# Patient Record
Sex: MALE | Race: BLACK OR AFRICAN AMERICAN | NOT HISPANIC OR LATINO | Employment: OTHER | URBAN - METROPOLITAN AREA
[De-identification: names, ages, dates, MRNs, and addresses within clinical notes are randomized per-mention and may not be internally consistent; named-entity substitution may affect disease eponyms.]

---

## 2021-11-05 ENCOUNTER — TELEPHONE (OUTPATIENT)
Dept: OTOLARYNGOLOGY | Facility: CLINIC | Age: 66
End: 2021-11-05
Payer: OTHER GOVERNMENT

## 2021-12-08 ENCOUNTER — OFFICE VISIT (OUTPATIENT)
Dept: OTOLARYNGOLOGY | Facility: CLINIC | Age: 66
End: 2021-12-08
Payer: OTHER GOVERNMENT

## 2021-12-08 VITALS — HEIGHT: 74 IN | WEIGHT: 153.25 LBS | BODY MASS INDEX: 19.67 KG/M2

## 2021-12-08 DIAGNOSIS — L72.0 EPIDERMAL INCLUSION CYST: Primary | ICD-10-CM

## 2021-12-08 PROCEDURE — 99999 PR PBB SHADOW E&M-EST. PATIENT-LVL III: CPT | Mod: PBBFAC,,, | Performed by: OTOLARYNGOLOGY

## 2021-12-08 PROCEDURE — 99213 OFFICE O/P EST LOW 20 MIN: CPT | Mod: PBBFAC | Performed by: OTOLARYNGOLOGY

## 2021-12-08 PROCEDURE — 99203 OFFICE O/P NEW LOW 30 MIN: CPT | Mod: S$PBB,,, | Performed by: OTOLARYNGOLOGY

## 2021-12-08 PROCEDURE — 99203 PR OFFICE/OUTPT VISIT, NEW, LEVL III, 30-44 MIN: ICD-10-PCS | Mod: S$PBB,,, | Performed by: OTOLARYNGOLOGY

## 2021-12-08 PROCEDURE — 99999 PR PBB SHADOW E&M-EST. PATIENT-LVL III: ICD-10-PCS | Mod: PBBFAC,,, | Performed by: OTOLARYNGOLOGY

## 2021-12-08 RX ORDER — NAPROXEN 500 MG/1
500 TABLET ORAL 2 TIMES DAILY WITH MEALS
COMMUNITY

## 2021-12-13 ENCOUNTER — PROCEDURE VISIT (OUTPATIENT)
Dept: OTOLARYNGOLOGY | Facility: CLINIC | Age: 66
End: 2021-12-13
Payer: OTHER GOVERNMENT

## 2021-12-13 VITALS — BODY MASS INDEX: 20.21 KG/M2 | WEIGHT: 157.44 LBS

## 2021-12-13 DIAGNOSIS — R22.1 NECK MASS: Primary | ICD-10-CM

## 2021-12-13 PROCEDURE — 21556 EXC NECK TUM DEEP < 5 CM: CPT | Mod: PBBFAC | Performed by: OTOLARYNGOLOGY

## 2021-12-13 PROCEDURE — 21556 PR EXC TUMOR SOFT TISSUE NECK/ANT THORAX SUBFASCIAL <5CM: ICD-10-PCS | Mod: S$PBB,,, | Performed by: OTOLARYNGOLOGY

## 2021-12-13 PROCEDURE — 88307 TISSUE EXAM BY PATHOLOGIST: CPT | Performed by: PATHOLOGY

## 2021-12-13 PROCEDURE — 88304 TISSUE EXAM BY PATHOLOGIST: CPT | Mod: 26,,, | Performed by: PATHOLOGY

## 2021-12-13 PROCEDURE — 88304 PR  SURG PATH,LEVEL III: ICD-10-PCS | Mod: 26,,, | Performed by: PATHOLOGY

## 2021-12-13 PROCEDURE — 21556 EXC NECK TUM DEEP < 5 CM: CPT | Mod: S$PBB,,, | Performed by: OTOLARYNGOLOGY

## 2021-12-13 PROCEDURE — 88304 TISSUE EXAM BY PATHOLOGIST: CPT | Performed by: PATHOLOGY

## 2021-12-13 RX ORDER — HYDROCODONE BITARTRATE AND ACETAMINOPHEN 7.5; 325 MG/1; MG/1
1 TABLET ORAL EVERY 6 HOURS PRN
Qty: 12 TABLET | Refills: 0 | Status: SHIPPED | OUTPATIENT
Start: 2021-12-13 | End: 2021-12-23

## 2021-12-13 RX ORDER — CLINDAMYCIN HYDROCHLORIDE 300 MG/1
300 CAPSULE ORAL 3 TIMES DAILY
Qty: 21 CAPSULE | Refills: 0 | OUTPATIENT
Start: 2021-12-13 | End: 2021-12-20

## 2021-12-21 LAB
FINAL PATHOLOGIC DIAGNOSIS: NORMAL
GROSS: NORMAL
Lab: NORMAL

## 2021-12-22 ENCOUNTER — OFFICE VISIT (OUTPATIENT)
Dept: OTOLARYNGOLOGY | Facility: CLINIC | Age: 66
End: 2021-12-22
Payer: OTHER GOVERNMENT

## 2021-12-22 VITALS — HEIGHT: 74 IN | WEIGHT: 156.5 LBS | BODY MASS INDEX: 20.09 KG/M2

## 2021-12-22 DIAGNOSIS — L72.0 EPIDERMAL INCLUSION CYST: Primary | ICD-10-CM

## 2021-12-22 PROCEDURE — 99999 PR PBB SHADOW E&M-EST. PATIENT-LVL III: ICD-10-PCS | Mod: PBBFAC,,, | Performed by: PHYSICIAN ASSISTANT

## 2021-12-22 PROCEDURE — 99213 OFFICE O/P EST LOW 20 MIN: CPT | Mod: PBBFAC | Performed by: PHYSICIAN ASSISTANT

## 2021-12-22 PROCEDURE — 99024 POSTOP FOLLOW-UP VISIT: CPT | Mod: S$PBB,,, | Performed by: PHYSICIAN ASSISTANT

## 2021-12-22 PROCEDURE — 99999 PR PBB SHADOW E&M-EST. PATIENT-LVL III: CPT | Mod: PBBFAC,,, | Performed by: PHYSICIAN ASSISTANT

## 2021-12-22 PROCEDURE — 99024 PR POST-OP FOLLOW-UP VISIT: ICD-10-PCS | Mod: S$PBB,,, | Performed by: PHYSICIAN ASSISTANT

## 2022-01-03 ENCOUNTER — OFFICE VISIT (OUTPATIENT)
Dept: OTOLARYNGOLOGY | Facility: CLINIC | Age: 67
End: 2022-01-03
Payer: OTHER GOVERNMENT

## 2022-01-03 VITALS — HEIGHT: 74 IN | BODY MASS INDEX: 19.43 KG/M2 | WEIGHT: 151.44 LBS

## 2022-01-03 DIAGNOSIS — L72.0 EPIDERMAL INCLUSION CYST: Primary | ICD-10-CM

## 2022-01-03 PROCEDURE — 99024 POSTOP FOLLOW-UP VISIT: CPT | Mod: ,,, | Performed by: OTOLARYNGOLOGY

## 2022-01-03 PROCEDURE — 99999 PR PBB SHADOW E&M-EST. PATIENT-LVL III: CPT | Mod: PBBFAC,,, | Performed by: OTOLARYNGOLOGY

## 2022-01-03 PROCEDURE — 99213 OFFICE O/P EST LOW 20 MIN: CPT | Mod: PBBFAC | Performed by: OTOLARYNGOLOGY

## 2022-01-03 PROCEDURE — 99024 PR POST-OP FOLLOW-UP VISIT: ICD-10-PCS | Mod: ,,, | Performed by: OTOLARYNGOLOGY

## 2022-01-03 PROCEDURE — 99999 PR PBB SHADOW E&M-EST. PATIENT-LVL III: ICD-10-PCS | Mod: PBBFAC,,, | Performed by: OTOLARYNGOLOGY

## 2022-01-03 NOTE — PROGRESS NOTES
"Subjective:   Patient: Kayode Negron [id number], :1955   Visit date:1/3/2022 1:15 PM    Chief Complaint:  Recheck wound on back of neck    HPI:  Kayode is a 66 y.o. male who is here for follow-up after excision of epidermal inclusion cyst by Dr. Neff.      Subjective: Patient reports lots of bleeding from the wound on evening of the procedure; says it was "hemorrhaging" and he almost went to ED.  His wife was able to apply pressure and the bleeding stopped.  He reports swelling in the area since that night; says swelling has gradually gone down but not fully resolved.  He's continued to ooze small amount of blood onto collar of his shirt but no further brisk bleeding.  No fever.  He's continued to take oral Clindamycin.    Surgery date: 21     Operations performed: Wide local excision subfascial neck lesion, 3x4 cm with Dr. Neff in clinic    Pathology:  Epidermal inclusion cyst    Cultures:  n/a    Review of Systems:  -     Allergic/Immunologic: is allergic to penicillins..  -     Constitutional: Current temp:      His meds, allergies, medical, surgical, social & family histories were reviewed & updated:  -     He has a current medication list which includes the following prescription(s): naproxen.  -     He  has no past medical history on file.   -     He does not have a problem list on file.   -     He  has no past surgical history on file.  -     He  reports that he has been smoking cigarettes. He has a 43.00 pack-year smoking history. He has never used smokeless tobacco.  -     His family history is not on file.  -     He is allergic to penicillins.    Objective:     Physical Exam:  Vitals:  Ht 6' 2" (1.88 m)   Wt 68.7 kg (151 lb 7.3 oz)   BMI 19.45 kg/m²   General appearance:  Well developed, well nourished, no apparent distress    Surgical site:  Appears to have wound dehiscence superiorly with scant amount of active bleeding when blotted with gauze.  No purulence expressed with palpation and no " significant erythema.  Mild induration 3cm area.            Assessment & Plan:   There are no diagnoses linked to this encounter.  S/p excision of epidermal inclusion cyst from his posterior neck last week; appears to have some wound dehiscence today.  Dr. Neff mentioned that the skin was so thin over the cyst that he was unable to place any deep sutures.  I will leave the skin sutures in place today as I'm fearful that his bleeding will be exacerbated with suture removal today.   I placed a dressing over the incision today and recommend he keep it clean and dry and covered.  He will be coming to see Dr. Neff early next week.  Instructed him to call with any new or worsening symptoms (fever, purulent drainage, worsening swelling).         1/3/21 update:  Patient had what sounds like a hematoma after the procedure and has since had some dehiscence of his incision.  Remaining sutures were removed today.  He has a healthy bed of granulation tissue.  We placed a wet to dry xeroform dressing and I instructed him to change this twice daily.  I explained that it may take several weeks for full healing to take place and he understands.

## 2022-01-10 ENCOUNTER — OFFICE VISIT (OUTPATIENT)
Dept: OTOLARYNGOLOGY | Facility: CLINIC | Age: 67
End: 2022-01-10
Payer: OTHER GOVERNMENT

## 2022-01-10 VITALS — HEIGHT: 74 IN | BODY MASS INDEX: 19.86 KG/M2 | WEIGHT: 154.75 LBS

## 2022-01-10 DIAGNOSIS — L72.0 EPIDERMAL INCLUSION CYST: Primary | ICD-10-CM

## 2022-01-10 PROCEDURE — 99212 OFFICE O/P EST SF 10 MIN: CPT | Mod: PBBFAC | Performed by: OTOLARYNGOLOGY

## 2022-01-10 PROCEDURE — 99024 POSTOP FOLLOW-UP VISIT: CPT | Mod: ,,, | Performed by: OTOLARYNGOLOGY

## 2022-01-10 PROCEDURE — 99999 PR PBB SHADOW E&M-EST. PATIENT-LVL II: ICD-10-PCS | Mod: PBBFAC,,, | Performed by: OTOLARYNGOLOGY

## 2022-01-10 PROCEDURE — 99999 PR PBB SHADOW E&M-EST. PATIENT-LVL II: CPT | Mod: PBBFAC,,, | Performed by: OTOLARYNGOLOGY

## 2022-01-10 PROCEDURE — 99024 PR POST-OP FOLLOW-UP VISIT: ICD-10-PCS | Mod: ,,, | Performed by: OTOLARYNGOLOGY

## 2022-01-10 NOTE — PROGRESS NOTES
"Subjective:   Patient: Kayode Negron [id number], :1955   Visit date:1/10/2022 1:15 PM    Chief Complaint:  Recheck wound on back of neck    HPI:  Kayode is a 66 y.o. male who is here for follow-up after excision of epidermal inclusion cyst by Dr. Neff.      Subjective: Patient reports lots of bleeding from the wound on evening of the procedure; says it was "hemorrhaging" and he almost went to ED.  His wife was able to apply pressure and the bleeding stopped.  He reports swelling in the area since that night; says swelling has gradually gone down but not fully resolved.  He's continued to ooze small amount of blood onto collar of his shirt but no further brisk bleeding.  No fever.  He's continued to take oral Clindamycin.    Surgery date: 21     Operations performed: Wide local excision subfascial neck lesion, 3x4 cm with Dr. Neff in clinic    Pathology:  Epidermal inclusion cyst    Cultures:  n/a    Review of Systems:  -     Allergic/Immunologic: is allergic to penicillins..  -     Constitutional: Current temp:      His meds, allergies, medical, surgical, social & family histories were reviewed & updated:  -     He has a current medication list which includes the following prescription(s): naproxen.  -     He  has no past medical history on file.   -     He does not have a problem list on file.   -     He  has no past surgical history on file.  -     He  reports that he has been smoking cigarettes. He has a 43.00 pack-year smoking history. He has never used smokeless tobacco.  -     His family history is not on file.  -     He is allergic to penicillins.    Objective:     Physical Exam:  Vitals:  Ht 6' 2" (1.88 m)   Wt 70.2 kg (154 lb 12.2 oz)   BMI 19.87 kg/m²   General appearance:  Well developed, well nourished, no apparent distress    Surgical site:  Appears to have wound dehiscence superiorly with scant amount of active bleeding when blotted with gauze.  No purulence expressed with palpation and no " significant erythema.  Mild induration 3cm area.            Assessment & Plan:   There are no diagnoses linked to this encounter.  S/p excision of epidermal inclusion cyst from his posterior neck last week; appears to have some wound dehiscence today.  Dr. Neff mentioned that the skin was so thin over the cyst that he was unable to place any deep sutures.  I will leave the skin sutures in place today as I'm fearful that his bleeding will be exacerbated with suture removal today.   I placed a dressing over the incision today and recommend he keep it clean and dry and covered.  He will be coming to see Dr. Neff early next week.  Instructed him to call with any new or worsening symptoms (fever, purulent drainage, worsening swelling).         1/3/21 update:  Patient had what sounds like a hematoma after the procedure and has since had some dehiscence of his incision.  Remaining sutures were removed today.  He has a healthy bed of granulation tissue.  We placed a wet to dry xeroform dressing and I instructed him to change this twice daily.  I explained that it may take several weeks for full healing to take place and he understands.    1/10/22 update:  Wound is much improved.  He has a much smaller dehiscent area with a healthy granulation bed.  Recommended discontinuing xeroform gauze and instead using antibiotic ointment and a Band-Aid for the next week.  After 1 week, I instructed him to leave the wound open.  He will return for evaluation in 3 weeks or sooner as needed

## 2022-01-31 ENCOUNTER — TELEPHONE (OUTPATIENT)
Dept: OTOLARYNGOLOGY | Facility: CLINIC | Age: 67
End: 2022-01-31
Payer: OTHER GOVERNMENT

## 2023-08-23 ENCOUNTER — TELEPHONE (OUTPATIENT)
Dept: GASTROENTEROLOGY | Facility: CLINIC | Age: 68
End: 2023-08-23
Payer: OTHER GOVERNMENT

## 2023-08-29 ENCOUNTER — TELEPHONE (OUTPATIENT)
Dept: GASTROENTEROLOGY | Facility: CLINIC | Age: 68
End: 2023-08-29
Payer: OTHER GOVERNMENT

## 2023-09-19 ENCOUNTER — TELEPHONE (OUTPATIENT)
Dept: GASTROENTEROLOGY | Facility: CLINIC | Age: 68
End: 2023-09-19
Payer: OTHER GOVERNMENT

## 2024-04-08 ENCOUNTER — TELEPHONE (OUTPATIENT)
Dept: OPHTHALMOLOGY | Facility: CLINIC | Age: 69
End: 2024-04-08
Payer: OTHER GOVERNMENT

## 2024-04-08 NOTE — TELEPHONE ENCOUNTER
----- Message from Jany De La Cruz sent at 4/8/2024  1:59 PM CDT -----  Regarding: referral  Good Afternoon    Dr. Erickson would like to refer the following patient to Dr. Tobin in the Ophthalmology department. The patients diagnosis is Anatomical narrow angle, left eye. I have scanned the patients referral and records into media manager.       Thank you,   Jany Mccoy

## 2024-05-06 DIAGNOSIS — M79.642 PAIN OF LEFT HAND: Primary | ICD-10-CM

## 2024-05-08 ENCOUNTER — OFFICE VISIT (OUTPATIENT)
Dept: ORTHOPEDICS | Facility: CLINIC | Age: 69
End: 2024-05-08
Payer: OTHER GOVERNMENT

## 2024-05-08 DIAGNOSIS — M72.0 DUPUYTREN'S CONTRACTURE: Primary | ICD-10-CM

## 2024-05-08 PROCEDURE — 99212 OFFICE O/P EST SF 10 MIN: CPT | Mod: PBBFAC,25,PN | Performed by: ORTHOPAEDIC SURGERY

## 2024-05-08 PROCEDURE — 99999 PR PBB SHADOW E&M-EST. PATIENT-LVL II: CPT | Mod: PBBFAC,,, | Performed by: ORTHOPAEDIC SURGERY

## 2024-05-08 PROCEDURE — 99203 OFFICE O/P NEW LOW 30 MIN: CPT | Mod: S$PBB,,, | Performed by: ORTHOPAEDIC SURGERY

## 2024-05-08 NOTE — PROGRESS NOTES
Assessment: 69 y.o. male with LRF and LSF dupuytrens contracture     I explained my diagnostic impression and the reasoning behind it in detail, using layman's terms.       Plan:   - Referred to hand. Is here for a few months in rehab and then will return to [place]. Recommended following with hand surgery in [place]       All questions were answered in detail. The patient is in full agreement with the treatment plan and will proceed accordingly.    Chief Complaint   Patient presents with    Left Hand - Pain       Initial visit (5/8/24): Kayode Negron is a 69 y.o. male who presents today complaining of Pain of the Left Hand     Duration of symptoms:  several months   Trauma or new activity: no  Pain is constant  Contracture of LRF and LSF PIP  Painful cords to these digits       This patient was seen in consultation at the request of [facility]    This is the extent of the patient's complaints at this time.          Review of patient's allergies indicates:   Allergen Reactions    Penicillins Swelling         Current Outpatient Medications:     amLODIPine (NORVASC) 5 MG tablet, Take 1 tablet (5 mg total) by mouth once daily., Disp: 30 tablet, Rfl: 0    naproxen (NAPROSYN) 500 MG tablet, Take 500 mg by mouth 2 (two) times daily with meals., Disp: , Rfl:     sertraline (ZOLOFT) 50 MG tablet, Take 1 tablet (50 mg total) by mouth once daily., Disp: 30 tablet, Rfl: 0    Physical Exam:   Vitals:    05/08/24 1054   PainSc: 0-No pain       General:  Patient is alert, awake and oriented to time, place and person. Mood and affect are appropriate.  Patient does not appear to be in any distress, denies any constitutional symptoms and appears stated age.   HEENT:  Pupils are equal and round, sclera are not injected. External examination of ears and nose reveals no abnormalities. Cranial nerves II-X are grossly intact  Skin:  no rashes, abrasions or open wounds on the affected extremity   Resp:  No  respiratory distress or audible wheezing   CV: 2+  pulses, all extremities warm and well perfused   Left Hand   Cords of LSF and LRF  Dimpling over LSF  30 degrees contracture of PIP of LSF and LRF  LTSI m/u/r  2+ RP  + EPL, IO, FDS, FDP      Imaging: 3 views of the left ahnd negative for fractures, degenerative changes       I personally reviewed and interpreted the patient's imaging obtained today in clinic       A note notifying [facility] of my findings was sent via the electronic medical record     This note was created by combination of typed  and M-Modal dictation. Transcription and phonetic errors may be present.  If there are any questions, please contact me.    No past medical history on file.    There are no problems to display for this patient.      No past surgical history on file.    No family history on file.

## 2024-05-29 ENCOUNTER — OFFICE VISIT (OUTPATIENT)
Dept: OPHTHALMOLOGY | Facility: CLINIC | Age: 69
End: 2024-05-29
Payer: OTHER GOVERNMENT

## 2024-05-29 ENCOUNTER — CLINICAL SUPPORT (OUTPATIENT)
Dept: OPHTHALMOLOGY | Facility: CLINIC | Age: 69
End: 2024-05-29
Payer: OTHER GOVERNMENT

## 2024-05-29 DIAGNOSIS — H40.032 ANATOMICAL NARROW ANGLE, LEFT EYE: Primary | ICD-10-CM

## 2024-05-29 DIAGNOSIS — H25.811 COMBINED FORM OF AGE-RELATED CATARACT, RIGHT EYE: ICD-10-CM

## 2024-05-29 DIAGNOSIS — H25.812 COMBINED FORM OF AGE-RELATED CATARACT, LEFT EYE: ICD-10-CM

## 2024-05-29 PROCEDURE — 92133 CPTRZD OPH DX IMG PST SGM ON: CPT | Mod: PBBFAC | Performed by: STUDENT IN AN ORGANIZED HEALTH CARE EDUCATION/TRAINING PROGRAM

## 2024-05-29 PROCEDURE — 92020 GONIOSCOPY: CPT | Mod: PBBFAC | Performed by: STUDENT IN AN ORGANIZED HEALTH CARE EDUCATION/TRAINING PROGRAM

## 2024-05-29 PROCEDURE — 92083 EXTENDED VISUAL FIELD XM: CPT | Mod: PBBFAC | Performed by: STUDENT IN AN ORGANIZED HEALTH CARE EDUCATION/TRAINING PROGRAM

## 2024-05-29 PROCEDURE — 99212 OFFICE O/P EST SF 10 MIN: CPT | Mod: PBBFAC | Performed by: STUDENT IN AN ORGANIZED HEALTH CARE EDUCATION/TRAINING PROGRAM

## 2024-05-29 PROCEDURE — 99204 OFFICE O/P NEW MOD 45 MIN: CPT | Mod: 25,S$PBB,, | Performed by: STUDENT IN AN ORGANIZED HEALTH CARE EDUCATION/TRAINING PROGRAM

## 2024-05-29 PROCEDURE — 99999 PR PBB SHADOW E&M-EST. PATIENT-LVL II: CPT | Mod: PBBFAC,,, | Performed by: STUDENT IN AN ORGANIZED HEALTH CARE EDUCATION/TRAINING PROGRAM

## 2024-05-29 PROCEDURE — 92020 GONIOSCOPY: CPT | Mod: S$PBB,,, | Performed by: STUDENT IN AN ORGANIZED HEALTH CARE EDUCATION/TRAINING PROGRAM

## 2024-05-29 PROCEDURE — 76514 ECHO EXAM OF EYE THICKNESS: CPT | Mod: PBBFAC | Performed by: STUDENT IN AN ORGANIZED HEALTH CARE EDUCATION/TRAINING PROGRAM

## 2024-05-29 PROCEDURE — 66761 REVISION OF IRIS: CPT | Mod: PBBFAC,LT | Performed by: STUDENT IN AN ORGANIZED HEALTH CARE EDUCATION/TRAINING PROGRAM
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
## 2024-05-30 NOTE — PROGRESS NOTES
VISUAL FIELD TEST 24-2 SS-OU-DONE/AB  OD-REL-GOOD-FIX-POOR-COOP-GOOD/AB  OS-REL-FIX-COOP-GOOD/AB    PT HAS NO KNOWN ALLERGIES TO LATEX OR ADHESIVES./AB